# Patient Record
Sex: FEMALE | Race: ASIAN | NOT HISPANIC OR LATINO
[De-identification: names, ages, dates, MRNs, and addresses within clinical notes are randomized per-mention and may not be internally consistent; named-entity substitution may affect disease eponyms.]

---

## 2020-02-10 PROBLEM — Z00.00 ENCOUNTER FOR PREVENTIVE HEALTH EXAMINATION: Status: ACTIVE | Noted: 2020-02-10

## 2020-04-08 ENCOUNTER — APPOINTMENT (OUTPATIENT)
Dept: CARDIOLOGY | Facility: CLINIC | Age: 40
End: 2020-04-08

## 2021-06-19 ENCOUNTER — EMERGENCY (EMERGENCY)
Facility: HOSPITAL | Age: 41
LOS: 1 days | Discharge: HOME | End: 2021-06-19
Attending: PERSONAL EMERGENCY RESPONSE ATTENDANT
Payer: COMMERCIAL

## 2021-06-19 VITALS
SYSTOLIC BLOOD PRESSURE: 143 MMHG | RESPIRATION RATE: 16 BRPM | TEMPERATURE: 98 F | OXYGEN SATURATION: 100 % | DIASTOLIC BLOOD PRESSURE: 91 MMHG | HEART RATE: 83 BPM

## 2021-06-19 VITALS
HEIGHT: 64 IN | TEMPERATURE: 98 F | DIASTOLIC BLOOD PRESSURE: 101 MMHG | WEIGHT: 130.07 LBS | HEART RATE: 86 BPM | SYSTOLIC BLOOD PRESSURE: 161 MMHG | OXYGEN SATURATION: 96 % | RESPIRATION RATE: 18 BRPM

## 2021-06-19 PROCEDURE — 99291 CRITICAL CARE FIRST HOUR: CPT

## 2021-06-19 PROCEDURE — 82962 GLUCOSE BLOOD TEST: CPT

## 2021-06-19 NOTE — ED PROVIDER NOTE - NS ED ROS FT
General: denies fever, chills  HENT: denies nasal congestion, rhinorrhea  Eyes: denies visual changes, blurred vision  CV: denies chest pain, palpitations  Resp: denies difficulty breathing, cough  Abdominal: + nausea, vomiting, denies abdominal pain  MSK: denies muscle aches, leg swelling  Neuro: denies headaches, numbness, + dizziness  Skin: denies rashes, bruises  Heme: denies petechia, abnormal bleeding

## 2021-06-19 NOTE — ED PROVIDER NOTE - PHYSICAL EXAMINATION
CONSTITUTIONAL: NAD, awake, alert  HEAD: Normocephalic; atraumatic  EYES: EOMI, no nystagmus  ENMT: External appears normal, MMM, dried blood in bilateral canals, TMs intact, no bulging, no erythema   NECK: no tenderness, FROM  CARD: Normal Sl, S2; no audible murmurs  RESP: normal wob, lungs ctab  ABD: soft, non-distended; non-tender  MSK: no edema, normal ROM in all four extremities  SKIN: Warm, dry, no rashes  NEURO: aaox3, moving all extremities spontaneously, no pronator drift, sensation grossly intact, no focal numbness, 5/5 strength throughout, speech is clear and appropriate, no facial droop

## 2021-06-19 NOTE — ED PROVIDER NOTE - NSFOLLOWUPINSTRUCTIONS_ED_ALL_ED_FT
Dizziness    Dizziness can manifest as a feeling of unsteadiness or light-headedness. You may feel like you are about to faint. This condition can be caused by a number of things, including medicines, dehydration, or illness. Drink enough fluid to keep your urine clear or pale yellow. Do not drink alcohol and limit your caffeine intake. Avoid quick or sudden movements.  Rise slowly from chairs and steady yourself until you feel okay. In the morning, first sit up on the side of the bed.    SEEK IMMEDIATE MEDICAL CARE IF YOU HAVE ANY OF THE FOLLOWING SYMPTOMS: vomiting, changes in your vision or speech, weakness in your arms or legs, trouble speaking or swallowing, chest pain, abdominal pain, shortness of breath, sweating, bleeding, headache, neck pain, or fever.    - Follow up with your primary care doctor in 1-2 days.    - Follow up with your ENT in 4-6 days.   - Bring results with you to the appointment.   - Return to the ED for new or worsening symptoms.

## 2021-06-19 NOTE — STROKE CODE NOTE - DISPOSITION
Patient was assessed by neurology team. Exam was nonfocal, all symptoms had resolved. Symptoms most consistent with acute vestibular syndrome of peripheral etiology.

## 2021-06-19 NOTE — ED PROVIDER NOTE - OBJECTIVE STATEMENT
40F w/ h/o of HTN, told to take lisinopril 'as needed,' presents w/ sudden onset room spinning dizziness around 530 am that lasted about an hour. States she vomited twice. States she feels back to baseline now. Denies chest pain, SOB, abdominal pain. States she frequently uses q tips in her ears

## 2021-06-19 NOTE — ED ADULT NURSE REASSESSMENT NOTE - NS ED NURSE REASSESS COMMENT FT1
Patient d/c. Reviewed d/c paperwork with patient, all questions answered at this time. Patient verbalizes understanding. IV removed. Patient instructed to return to the ER for any worsening s/s including chest pain, SOB, dizziness, fever, n/v/d. Patient alert and stable at time of d/c. Patient will follow up with ENT and her PCP.

## 2021-06-19 NOTE — ED PROVIDER NOTE - ATTENDING CONTRIBUTION TO CARE
Attending MD Barraza.  Agree with above.  Pt arrives as stroke code called in triage.  Pt is a 41 yo female with pmhx of HTN who presents to ED after she went to bed last night at 2300 asymptomatic and awoke this morning ~0530 with severe room spinning dizziness without associated headache/neck pain or any other pain complaints.  Pt states she couldn’t get up because of the severity of room spinning and eventually her  brought her to the bathroom where she vomited.  She didn’t note much change in her sxs with movement but states that when she bends over, she notes worsening of sxs.  No assoc slurred speech, numbness/weakness of any extremity.  Pt went back to bed and vomited again.  Took BP and noted mild elevation.  Took a BP med but vomited again.  On arrival to ED sxs have now resolved.  Pt is neurologically afocal.  Pt’s hx/exam/risk factors/age all c/w peripheral vertigo.  Pt ambulated in ED without ataxia.  No limb weakness/facial droop/slurred speech/alteration of consciousness.  Neurology and ED at bedside and in agreement with discontinuation of stroke code.      Pt now resolved.  Feels well, neuro intact.  Pt stable for discharge home to f/u with ENT as outpt.  Return to ED for recurrent sxs that are uncontrolled. Attending MD Barraza.  Agree with above.  Pt arrives as stroke code called in triage.  Pt is a 41 yo female with pmhx of HTN who presents to ED after she went to bed last night at 2300 asymptomatic and awoke this morning ~0530 with severe room spinning dizziness without associated headache/neck pain or any other pain complaints.  Pt states she couldn’t get up because of the severity of room spinning and eventually her  brought her to the bathroom where she vomited.  She didn’t note much change in her sxs with movement but states that when she bends over, she notes worsening of sxs.  No assoc slurred speech, numbness/weakness of any extremity.  Pt went back to bed and vomited again.  Took BP and noted mild elevation.  Took a BP med but vomited again.  On arrival to ED sxs have now resolved.  Pt is neurologically afocal.  Pt’s hx/exam/risk factors/age all c/w peripheral vertigo.  Pt ambulated in ED without ataxia.  No limb weakness/facial droop/slurred speech/alteration of consciousness.  Neurology and ED at bedside and in agreement with discontinuation of stroke code.      Pt now resolved.  Feels well, neuro intact.  Pt stable for discharge home to f/u with ENT as outpt.  Return to ED for recurrent sxs that are uncontrolled.    Critical care billing:  Upon my evaluation, this patient had a high probability of imminent or life-threatening deterioration due to stroke code, which required my direct attention, intervention, and personal management.  The patient has a  medical condition that impairs one or more vital organ systems.  Frequent personal assessment and adjustment of medical interventions was performed.      I have personally provided 30 minutes of critical care time exclusive of time spent on separately billable procedures. Time includes review of laboratory data, radiology results, discussion with consultants, patient and family; monitoring for potential decompensation, as well as time spent retrieving data and reviewing the chart and documenting the visit. Interventions were performed as documented above.

## 2021-06-19 NOTE — ED PROVIDER NOTE - MDM ORDERS SUBMITTED SELECTION
Please check your blood pressures at home twice a day, please write down the numbers  Please bring your blood pressure cuff with you for the next appointment  Please start lisinopril 20 mg at night 
Not Applicable

## 2021-06-19 NOTE — ED PROVIDER NOTE - PATIENT PORTAL LINK FT
You can access the FollowMyHealth Patient Portal offered by [facility] by registering at the following website: [email or web address]. By joining [employer]’s FollowMyHealth portal, you will also be able to view your health information using other applications (apps) compatible with our system.

## 2021-06-19 NOTE — ED ADULT NURSE NOTE - CHIEF COMPLAINT QUOTE
onset 530am. PMH HTN. Room spinning, sweating at home Brought to ER via ambulance. Could not walk earlier at home. Denies HA. A&Ox4

## 2021-06-19 NOTE — ED ADULT TRIAGE NOTE - CHIEF COMPLAINT QUOTE
onset 530am. PMH HTN. Room spinning, sweating at home Brought to ER via ambulance onset 530am. PMH HTN. Room spinning, sweating at home Brought to ER via ambulance. Could not walk earlier at home. Denies HA. A&Ox4

## 2021-06-19 NOTE — ED PROVIDER NOTE - CLINICAL SUMMARY MEDICAL DECISION MAKING FREE TEXT BOX
40F w/ 1 hours of room spinning dizziness that has resolved, no focal neuro deficits, exam wnl here, code stroke called, neuro evaluated patient, given no fnd, no significant medical history, asymptomatic now, code stroke cancelled after neuro exam w/ neuro at bedside, patient agrees w/ discharge, strict return precautions provided, states she has an ent to follow up with, no chest pain or abdominal pain, no sob, low suspicion for dissection, patient appears well

## 2021-06-19 NOTE — ED ADULT NURSE NOTE - OBJECTIVE STATEMENT
40y female pmhx HTN & DM BIB EMS presenting to ED complaining of dizziness upon waking up at 530am. pt states the room felt like it was spinning when she got up out of bed, she vomited 3x at home and was sweating pt states she was unable to walk at home because of feeling dizzy. pt  is at bedside. pt denies LOC, fall, confusion, headache, dizziness, nausea, and vomiting has subsided since presenting to ED, denies weakness, blurred vision, numbness, weakness. pt is  A&Ox4, breathing spontaneous and unlabored on RA, abd is soft nontender on palpation, skin is warm, dry and appropriate for race, pt was able to walk up and down [place] with no s/s of neurological deficients. Code Stroke canceled, dysphagia screen completed and pt passed, see neuro flow sheet for more info.

## 2024-02-23 PROBLEM — Z78.9 OTHER SPECIFIED HEALTH STATUS: Chronic | Status: ACTIVE | Noted: 2021-06-19

## 2024-03-11 ENCOUNTER — APPOINTMENT (OUTPATIENT)
Dept: OBGYN | Facility: CLINIC | Age: 44
End: 2024-03-11
Payer: COMMERCIAL

## 2024-03-11 ENCOUNTER — RESULT REVIEW (OUTPATIENT)
Age: 44
End: 2024-03-11

## 2024-03-11 PROCEDURE — 99396 PREV VISIT EST AGE 40-64: CPT

## 2025-06-23 ENCOUNTER — NON-APPOINTMENT (OUTPATIENT)
Age: 45
End: 2025-06-23